# Patient Record
Sex: FEMALE | Race: WHITE | Employment: PART TIME | URBAN - METROPOLITAN AREA
[De-identification: names, ages, dates, MRNs, and addresses within clinical notes are randomized per-mention and may not be internally consistent; named-entity substitution may affect disease eponyms.]

---
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
## 2019-01-11 ENCOUNTER — OFFICE VISIT (OUTPATIENT)
Dept: GYNECOLOGY | Age: 19
End: 2019-01-11
Payer: COMMERCIAL

## 2019-01-11 VITALS
DIASTOLIC BLOOD PRESSURE: 80 MMHG | HEART RATE: 74 BPM | BODY MASS INDEX: 27.81 KG/M2 | SYSTOLIC BLOOD PRESSURE: 119 MMHG | WEIGHT: 157 LBS

## 2019-01-11 DIAGNOSIS — Z30.430 ENCOUNTER FOR IUD INSERTION: Primary | ICD-10-CM

## 2019-01-11 PROCEDURE — 99213 OFFICE O/P EST LOW 20 MIN: CPT | Performed by: OBSTETRICS & GYNECOLOGY

## 2019-01-11 PROCEDURE — 1036F TOBACCO NON-USER: CPT | Performed by: OBSTETRICS & GYNECOLOGY

## 2019-01-11 PROCEDURE — G8427 DOCREV CUR MEDS BY ELIG CLIN: HCPCS | Performed by: OBSTETRICS & GYNECOLOGY

## 2019-01-11 PROCEDURE — G8419 CALC BMI OUT NRM PARAM NOF/U: HCPCS | Performed by: OBSTETRICS & GYNECOLOGY

## 2019-01-11 PROCEDURE — G8484 FLU IMMUNIZE NO ADMIN: HCPCS | Performed by: OBSTETRICS & GYNECOLOGY

## 2019-02-01 ENCOUNTER — TELEPHONE (OUTPATIENT)
Dept: FAMILY MEDICINE CLINIC | Age: 19
End: 2019-02-01

## 2019-02-01 RX ORDER — FLUCONAZOLE 150 MG/1
150 TABLET ORAL ONCE
Qty: 1 TABLET | Refills: 0 | Status: SHIPPED | OUTPATIENT
Start: 2019-02-01 | End: 2019-02-01

## 2019-10-15 ENCOUNTER — OFFICE VISIT (OUTPATIENT)
Dept: FAMILY MEDICINE CLINIC | Age: 19
End: 2019-10-15
Payer: COMMERCIAL

## 2019-10-15 VITALS
BODY MASS INDEX: 26.05 KG/M2 | SYSTOLIC BLOOD PRESSURE: 108 MMHG | OXYGEN SATURATION: 97 % | WEIGHT: 147 LBS | HEIGHT: 63 IN | TEMPERATURE: 98.3 F | DIASTOLIC BLOOD PRESSURE: 78 MMHG | HEART RATE: 87 BPM

## 2019-10-15 DIAGNOSIS — F50.9 EATING DISORDER, UNSPECIFIED TYPE: ICD-10-CM

## 2019-10-15 DIAGNOSIS — K58.2 IRRITABLE BOWEL SYNDROME WITH BOTH CONSTIPATION AND DIARRHEA: ICD-10-CM

## 2019-10-15 DIAGNOSIS — F41.9 ANXIETY: ICD-10-CM

## 2019-10-15 DIAGNOSIS — F32.2 CURRENT SEVERE EPISODE OF MAJOR DEPRESSIVE DISORDER WITHOUT PSYCHOTIC FEATURES, UNSPECIFIED WHETHER RECURRENT (HCC): Primary | ICD-10-CM

## 2019-10-15 PROCEDURE — G8484 FLU IMMUNIZE NO ADMIN: HCPCS | Performed by: FAMILY MEDICINE

## 2019-10-15 PROCEDURE — 96160 PT-FOCUSED HLTH RISK ASSMT: CPT | Performed by: FAMILY MEDICINE

## 2019-10-15 PROCEDURE — 1036F TOBACCO NON-USER: CPT | Performed by: FAMILY MEDICINE

## 2019-10-15 PROCEDURE — G8427 DOCREV CUR MEDS BY ELIG CLIN: HCPCS | Performed by: FAMILY MEDICINE

## 2019-10-15 PROCEDURE — 99215 OFFICE O/P EST HI 40 MIN: CPT | Performed by: FAMILY MEDICINE

## 2019-10-15 PROCEDURE — G8419 CALC BMI OUT NRM PARAM NOF/U: HCPCS | Performed by: FAMILY MEDICINE

## 2019-10-15 RX ORDER — FLUOXETINE 10 MG/1
10 CAPSULE ORAL DAILY
Qty: 30 CAPSULE | Refills: 0 | Status: SHIPPED | OUTPATIENT
Start: 2019-10-15 | End: 2019-11-08 | Stop reason: CLARIF

## 2019-10-15 RX ORDER — FLUOXETINE HYDROCHLORIDE 20 MG/1
20 CAPSULE ORAL DAILY
Qty: 30 CAPSULE | Refills: 5 | Status: SHIPPED | OUTPATIENT
Start: 2019-10-15 | End: 2019-11-08 | Stop reason: CLARIF

## 2019-10-15 ASSESSMENT — COLUMBIA-SUICIDE SEVERITY RATING SCALE - C-SSRS
5. HAVE YOU STARTED TO WORK OUT OR WORKED OUT THE DETAILS OF HOW TO KILL YOURSELF? DO YOU INTEND TO CARRY OUT THIS PLAN?: NO
1. WITHIN THE PAST MONTH, HAVE YOU WISHED YOU WERE DEAD OR WISHED YOU COULD GO TO SLEEP AND NOT WAKE UP?: YES
4. HAVE YOU HAD THESE THOUGHTS AND HAD SOME INTENTION OF ACTING ON THEM?: YES
6. HAVE YOU EVER DONE ANYTHING, STARTED TO DO ANYTHING, OR PREPARED TO DO ANYTHING TO END YOUR LIFE?: NO
3. HAVE YOU BEEN THINKING ABOUT HOW YOU MIGHT KILL YOURSELF?: NO
2. HAVE YOU ACTUALLY HAD ANY THOUGHTS OF KILLING YOURSELF?: YES

## 2019-10-15 ASSESSMENT — PATIENT HEALTH QUESTIONNAIRE - PHQ9
9. THOUGHTS THAT YOU WOULD BE BETTER OFF DEAD, OR OF HURTING YOURSELF: 1
SUM OF ALL RESPONSES TO PHQ9 QUESTIONS 1 & 2: 4
6. FEELING BAD ABOUT YOURSELF - OR THAT YOU ARE A FAILURE OR HAVE LET YOURSELF OR YOUR FAMILY DOWN: 2
4. FEELING TIRED OR HAVING LITTLE ENERGY: 3
1. LITTLE INTEREST OR PLEASURE IN DOING THINGS: 2
SUM OF ALL RESPONSES TO PHQ QUESTIONS 1-9: 18
SUM OF ALL RESPONSES TO PHQ QUESTIONS 1-9: 18
5. POOR APPETITE OR OVEREATING: 3
10. IF YOU CHECKED OFF ANY PROBLEMS, HOW DIFFICULT HAVE THESE PROBLEMS MADE IT FOR YOU TO DO YOUR WORK, TAKE CARE OF THINGS AT HOME, OR GET ALONG WITH OTHER PEOPLE: 1
8. MOVING OR SPEAKING SO SLOWLY THAT OTHER PEOPLE COULD HAVE NOTICED. OR THE OPPOSITE, BEING SO FIGETY OR RESTLESS THAT YOU HAVE BEEN MOVING AROUND A LOT MORE THAN USUAL: 1
7. TROUBLE CONCENTRATING ON THINGS, SUCH AS READING THE NEWSPAPER OR WATCHING TELEVISION: 1
3. TROUBLE FALLING OR STAYING ASLEEP: 3
2. FEELING DOWN, DEPRESSED OR HOPELESS: 2

## 2019-10-17 PROBLEM — K58.2 IRRITABLE BOWEL SYNDROME WITH BOTH CONSTIPATION AND DIARRHEA: Status: ACTIVE | Noted: 2019-10-17

## 2019-10-17 PROBLEM — F50.9 EATING DISORDER: Status: ACTIVE | Noted: 2019-10-17

## 2019-10-17 PROBLEM — F32.2 CURRENT SEVERE EPISODE OF MAJOR DEPRESSIVE DISORDER WITHOUT PSYCHOTIC FEATURES (HCC): Status: ACTIVE | Noted: 2019-10-17

## 2019-10-29 ENCOUNTER — OFFICE VISIT (OUTPATIENT)
Dept: FAMILY MEDICINE CLINIC | Age: 19
End: 2019-10-29
Payer: COMMERCIAL

## 2019-10-29 VITALS
BODY MASS INDEX: 25.87 KG/M2 | OXYGEN SATURATION: 99 % | RESPIRATION RATE: 16 BRPM | DIASTOLIC BLOOD PRESSURE: 66 MMHG | TEMPERATURE: 97.5 F | HEART RATE: 91 BPM | SYSTOLIC BLOOD PRESSURE: 108 MMHG | HEIGHT: 63 IN | WEIGHT: 146 LBS

## 2019-10-29 DIAGNOSIS — H66.91 OTITIS MEDIA OF RIGHT EAR WITH RUPTURE OF TYMPANIC MEMBRANE: ICD-10-CM

## 2019-10-29 DIAGNOSIS — J02.9 SORE THROAT: Primary | ICD-10-CM

## 2019-10-29 DIAGNOSIS — R05.9 COUGH: ICD-10-CM

## 2019-10-29 DIAGNOSIS — H72.91 OTITIS MEDIA OF RIGHT EAR WITH RUPTURE OF TYMPANIC MEMBRANE: ICD-10-CM

## 2019-10-29 LAB
HETEROPHILE ANTIBODIES: NEGATIVE
INFLUENZA A ANTIGEN, POC: NEGATIVE
INFLUENZA B ANTIGEN, POC: NEGATIVE
S PYO AG THROAT QL: NORMAL

## 2019-10-29 PROCEDURE — G8419 CALC BMI OUT NRM PARAM NOF/U: HCPCS | Performed by: NURSE PRACTITIONER

## 2019-10-29 PROCEDURE — G8484 FLU IMMUNIZE NO ADMIN: HCPCS | Performed by: NURSE PRACTITIONER

## 2019-10-29 PROCEDURE — G8427 DOCREV CUR MEDS BY ELIG CLIN: HCPCS | Performed by: NURSE PRACTITIONER

## 2019-10-29 PROCEDURE — 87804 INFLUENZA ASSAY W/OPTIC: CPT | Performed by: NURSE PRACTITIONER

## 2019-10-29 PROCEDURE — 99214 OFFICE O/P EST MOD 30 MIN: CPT | Performed by: NURSE PRACTITIONER

## 2019-10-29 PROCEDURE — 87880 STREP A ASSAY W/OPTIC: CPT | Performed by: NURSE PRACTITIONER

## 2019-10-29 PROCEDURE — 1036F TOBACCO NON-USER: CPT | Performed by: NURSE PRACTITIONER

## 2019-10-29 PROCEDURE — 86308 HETEROPHILE ANTIBODY SCREEN: CPT | Performed by: NURSE PRACTITIONER

## 2019-10-29 RX ORDER — AMOXICILLIN AND CLAVULANATE POTASSIUM 875; 125 MG/1; MG/1
1 TABLET, FILM COATED ORAL 2 TIMES DAILY
Qty: 20 TABLET | Refills: 0 | Status: CANCELLED | OUTPATIENT
Start: 2019-10-29 | End: 2019-11-08

## 2019-10-29 RX ORDER — CIPROFLOXACIN AND DEXAMETHASONE 3; 1 MG/ML; MG/ML
4 SUSPENSION/ DROPS AURICULAR (OTIC) 2 TIMES DAILY
Qty: 1 BOTTLE | Refills: 0 | Status: SHIPPED | OUTPATIENT
Start: 2019-10-29 | End: 2019-11-05

## 2019-10-29 RX ORDER — AZITHROMYCIN 250 MG/1
TABLET, FILM COATED ORAL
Qty: 1 PACKET | Refills: 0 | Status: SHIPPED | OUTPATIENT
Start: 2019-10-29 | End: 2019-11-08

## 2019-10-29 ASSESSMENT — ENCOUNTER SYMPTOMS
RHINORRHEA: 1
SORE THROAT: 1
NAUSEA: 0
VOMITING: 0
ABDOMINAL PAIN: 0
DIARRHEA: 0
COUGH: 1

## 2019-10-31 LAB — THROAT CULTURE: NORMAL

## 2019-11-08 ENCOUNTER — OFFICE VISIT (OUTPATIENT)
Dept: FAMILY MEDICINE CLINIC | Age: 19
End: 2019-11-08
Payer: COMMERCIAL

## 2019-11-08 VITALS
BODY MASS INDEX: 26.05 KG/M2 | HEART RATE: 80 BPM | DIASTOLIC BLOOD PRESSURE: 82 MMHG | HEIGHT: 63 IN | OXYGEN SATURATION: 98 % | WEIGHT: 147 LBS | SYSTOLIC BLOOD PRESSURE: 110 MMHG

## 2019-11-08 DIAGNOSIS — H72.91 OTITIS MEDIA OF RIGHT EAR WITH RUPTURE OF TYMPANIC MEMBRANE: ICD-10-CM

## 2019-11-08 DIAGNOSIS — F32.2 CURRENT SEVERE EPISODE OF MAJOR DEPRESSIVE DISORDER WITHOUT PSYCHOTIC FEATURES, UNSPECIFIED WHETHER RECURRENT (HCC): Primary | ICD-10-CM

## 2019-11-08 DIAGNOSIS — H66.91 OTITIS MEDIA OF RIGHT EAR WITH RUPTURE OF TYMPANIC MEMBRANE: ICD-10-CM

## 2019-11-08 DIAGNOSIS — F50.9 EATING DISORDER, UNSPECIFIED TYPE: ICD-10-CM

## 2019-11-08 PROCEDURE — 1036F TOBACCO NON-USER: CPT | Performed by: FAMILY MEDICINE

## 2019-11-08 PROCEDURE — G8419 CALC BMI OUT NRM PARAM NOF/U: HCPCS | Performed by: FAMILY MEDICINE

## 2019-11-08 PROCEDURE — G8427 DOCREV CUR MEDS BY ELIG CLIN: HCPCS | Performed by: FAMILY MEDICINE

## 2019-11-08 PROCEDURE — 99214 OFFICE O/P EST MOD 30 MIN: CPT | Performed by: FAMILY MEDICINE

## 2019-11-08 PROCEDURE — G8484 FLU IMMUNIZE NO ADMIN: HCPCS | Performed by: FAMILY MEDICINE

## 2019-11-08 RX ORDER — FLUOXETINE 10 MG/1
10 CAPSULE ORAL DAILY
Qty: 30 CAPSULE | Refills: 3 | Status: SHIPPED | OUTPATIENT
Start: 2019-11-08

## 2020-10-12 ENCOUNTER — TELEPHONE (OUTPATIENT)
Dept: FAMILY MEDICINE CLINIC | Age: 20
End: 2020-10-12

## 2020-10-12 NOTE — TELEPHONE ENCOUNTER
----- Message from Randal Salgado sent at 10/12/2020  9:58 AM EDT -----  Subject: Message to Provider    QUESTIONS  Information for Provider? p/t would like her medical records sent to   [facility] in [place]   Fax#? [phone]. I notified her that she may need to sign a release   so she would like a call back to confirm. ---------------------------------------------------------------------------  --------------  Andrés BLOOD  What is the best way for the office to contact you? OK to leave message on   voicemail  Preferred Call Back Phone Number? [phone]  ---------------------------------------------------------------------------  --------------  SCRIPT ANSWERS  Relationship to Patient?  Self

## 2021-05-06 ENCOUNTER — OFFICE VISIT (OUTPATIENT)
Dept: ORTHOPEDIC SURGERY | Age: 21
End: 2021-05-06
Payer: COMMERCIAL

## 2021-05-06 VITALS — TEMPERATURE: 97.9 F | HEIGHT: 63 IN | RESPIRATION RATE: 14 BRPM | WEIGHT: 182.6 LBS | BODY MASS INDEX: 32.36 KG/M2

## 2021-05-06 DIAGNOSIS — M25.561 RIGHT KNEE PAIN, UNSPECIFIED CHRONICITY: Primary | ICD-10-CM

## 2021-05-06 PROCEDURE — G8417 CALC BMI ABV UP PARAM F/U: HCPCS | Performed by: ORTHOPAEDIC SURGERY

## 2021-05-06 PROCEDURE — 1036F TOBACCO NON-USER: CPT | Performed by: ORTHOPAEDIC SURGERY

## 2021-05-06 PROCEDURE — G8427 DOCREV CUR MEDS BY ELIG CLIN: HCPCS | Performed by: ORTHOPAEDIC SURGERY

## 2021-05-06 PROCEDURE — 99203 OFFICE O/P NEW LOW 30 MIN: CPT | Performed by: ORTHOPAEDIC SURGERY

## 2021-05-06 SDOH — ECONOMIC STABILITY: TRANSPORTATION INSECURITY
IN THE PAST 12 MONTHS, HAS THE LACK OF TRANSPORTATION KEPT YOU FROM MEDICAL APPOINTMENTS OR FROM GETTING MEDICATIONS?: NOT ASKED

## 2021-05-06 SDOH — ECONOMIC STABILITY: INCOME INSECURITY: HOW HARD IS IT FOR YOU TO PAY FOR THE VERY BASICS LIKE FOOD, HOUSING, MEDICAL CARE, AND HEATING?: NOT ASKED

## 2021-05-06 SDOH — HEALTH STABILITY: MENTAL HEALTH: HOW MANY STANDARD DRINKS CONTAINING ALCOHOL DO YOU HAVE ON A TYPICAL DAY?: NOT ASKED

## 2021-05-06 SDOH — ECONOMIC STABILITY: TRANSPORTATION INSECURITY
IN THE PAST 12 MONTHS, HAS LACK OF TRANSPORTATION KEPT YOU FROM MEETINGS, WORK, OR FROM GETTING THINGS NEEDED FOR DAILY LIVING?: NOT ASKED

## 2021-05-10 ENCOUNTER — TELEPHONE (OUTPATIENT)
Dept: ORTHOPEDIC SURGERY | Age: 21
End: 2021-05-10

## 2021-05-18 ENCOUNTER — HOSPITAL ENCOUNTER (OUTPATIENT)
Dept: MRI IMAGING | Age: 21
Discharge: HOME | End: 2021-05-18
Payer: COMMERCIAL

## 2021-05-18 DIAGNOSIS — M25.561 RIGHT KNEE PAIN, UNSPECIFIED CHRONICITY: ICD-10-CM

## 2021-05-18 PROCEDURE — 73721 MRI JNT OF LWR EXTRE W/O DYE: CPT

## 2021-05-20 ENCOUNTER — OFFICE VISIT (OUTPATIENT)
Dept: ORTHOPEDIC SURGERY | Age: 21
End: 2021-05-20
Payer: COMMERCIAL

## 2021-05-20 VITALS — WEIGHT: 182 LBS | RESPIRATION RATE: 14 BRPM | BODY MASS INDEX: 32.25 KG/M2 | HEIGHT: 63 IN

## 2021-05-20 DIAGNOSIS — M84.362A STRESS FRACTURE OF LEFT TIBIA, INITIAL ENCOUNTER: Primary | ICD-10-CM

## 2021-05-20 DIAGNOSIS — M22.2X1 PATELLOFEMORAL PAIN SYNDROME OF BOTH KNEES: ICD-10-CM

## 2021-05-20 DIAGNOSIS — M22.2X2 PATELLOFEMORAL PAIN SYNDROME OF BOTH KNEES: ICD-10-CM

## 2021-05-20 PROCEDURE — 1036F TOBACCO NON-USER: CPT | Performed by: ORTHOPAEDIC SURGERY

## 2021-05-20 PROCEDURE — G8427 DOCREV CUR MEDS BY ELIG CLIN: HCPCS | Performed by: ORTHOPAEDIC SURGERY

## 2021-05-20 PROCEDURE — G8417 CALC BMI ABV UP PARAM F/U: HCPCS | Performed by: ORTHOPAEDIC SURGERY

## 2021-05-20 PROCEDURE — 99213 OFFICE O/P EST LOW 20 MIN: CPT | Performed by: ORTHOPAEDIC SURGERY

## 2021-05-20 PROCEDURE — 27530 TREAT KNEE FRACTURE: CPT | Performed by: ORTHOPAEDIC SURGERY

## 2021-05-20 NOTE — PROGRESS NOTES
CHIEF COMPLAINT: Right knee pain. Date initial fracture care: 5/20/21    History:   Jordan Muñoz is a 23 y.o. female here for right knee follow-up. Initial history: self-referred for evaluation and treatment of right knee pain / injury. The patient complains of right knee pain. This is evaluated as a personal injury. The pain began 2 years ago. Rate pain 6/10. There was not a history of injury. The pain is located anterior/patellar. She does complain of medial pain also. Pain is worse with stairs, walking, squatting, kneeling, and sitting for long time. The knee has not given out or felt unstable. The patient can bend and straighten the knee fully. There is swelling in the knee. There was not catching / locking of the knee. The patient has not had PT. The patient has not had an injection. The patient has taken NSAIDs. The patient has tried ice. 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 Interval History: Her knee feels better now. She rates pain 3/10. She still has some pain and aching with prolonged standing. She has been taking Tylenol as needed. Past Medical History:   Diagnosis Date    Anorexia     Dysmenorrhea     Heavy periods     Irregular uterine bleeding        Past Surgical History:   Procedure Laterality Date    MYRINGOTOMY      TONSILLECTOMY AND ADENOIDECTOMY  2006       Family History   Problem Relation Age of Onset    Diabetes Mother        Social History     Socioeconomic History    Marital status: Single     Spouse name: Not on file    Number of children: Not on file    Years of education: Not on file    Highest education level: Not on file   Occupational History    Occupation: Visitation Facillitator     Employer: [employer]   Tobacco Use    Smoking status: Never Smoker    Smokeless tobacco: Never Used    Tobacco comment: encouraged not to start smoking.    Vaping Use    Vaping Use: Never used   Substance and Sexual Activity    Physical Examination:     Vital signs:   Ht 5' 3\" (1.6 m)   Wt 182 lb (82.6 kg)   BMI 32.24 kg/m²     General:  alert, appears stated age, cooperative and no distress   Gait:  Normal. The patient can bear weight on the injured extremity. Right Knee  Alignment:  neutral   ROM:  0 degrees extension to 120 degrees flexion   Bilateral knees   Crepitus:  mild patellar   Joint Tenderness:  medial joint line bilaterally. She does have some tenderness along her proximal medial tibial metaphysis   Effusion:   20 cc   Patellar excursion:  1 of 4 quadrants. She is unable to relax either knee. Patellar tilt test:  positive, though she cannot relax either knee. Patellar facet tenderness:  positive medial   positive lateral   Lachman test:  negative   Left knee: negative   Anterior drawer test:  negative   Left knee: negative   Posterior drawer:   negative    Left knee: negative   Varus laxity at 30 degrees:  negative   Left knee: negative   Valgus laxity at 30 degrees:   negative   Left knee: negative   Anthony's test:  positive   Left knee: negative     There is not any cellulitis, lymphedema or cutaneous lesions noted in the lower extremities. Motor exam of the lower extremities show quadriceps, hamstrings, foot dorsiflexion and plantarflexion grossly intact. Sensation to both feet is grossly intact to light touch. The bilateral lower extremities are warm and well-perfused with brisk capillary refill. Imaging:  Right Knee X-Ray 5/6/21: No fracture, dislocation, lytic lesion. Normal joint spaces     Right Knee MRI 5/18/21: I independently reviewed the images, as well as the radiology report.    Nondisplaced fracture involving the medial aspect of the proximal tibial   metaphysis.  This is favored to be a stress fracture based on location.       Cruciate and collateral ligaments are intact.  No meniscal tear identified.           Vitamin D25 Hydroxy: 12.4      Assessment:     Right tibial metaphyseal stress fracture  Right knee patellofemoral syndrome  Left knee patellofemoral syndrome  Vitamin D deficiency      Plan:     Natural history and expected course discussed. Questions answered. Discussed with patient that I do suspect that the majority of her symptoms are still coming from patellofemoral syndrome. PT referral provided for patellofemoral syndrome. We reviewed the MRI images and discussed the findings. We discussed that there appears to be medial tibial metaphyseal stress fracture. Discussed that fracture can be treated nonoperatively. We discussed that her previous blood work did show vitamin D deficiency. Discussed that the stress fracture could be the result of altered bone metabolism secondary to her vitamin D deficiency. She is to follow-up with her PCP for further work-up and treatment. Follow-up with me for her patellofemoral syndrome in 2 to 3 months. Royce Moulton. Ellen Robb MD  Orthopaedic Surgery and Sports Medicine     Disclaimer: This note was generated with use of a verbal recognition program (DRAGON) and an attempt was made to check for errors. It is possible that there are still dictated errors within this office note. If so, please bring any significant errors to my attention for an addendum. All efforts were made to ensure that this office note is accurate.

## 2021-05-27 ENCOUNTER — HOSPITAL ENCOUNTER (OUTPATIENT)
Dept: PHYSICAL THERAPY | Age: 21
Setting detail: THERAPIES SERIES
Discharge: HOME | End: 2021-05-27
Payer: COMMERCIAL

## 2021-05-27 PROCEDURE — 97161 PT EVAL LOW COMPLEX 20 MIN: CPT

## 2021-05-27 PROCEDURE — 97110 THERAPEUTIC EXERCISES: CPT

## 2021-05-27 NOTE — PLAN OF CARE
Nils Yeung [address]                                                       Physical Therapy Certification    Dear Referring Practitioner: Alfonso Rolle,    We had the pleasure of evaluating the following patient for physical therapy services at [address] [place] [address]. A summary of our findings can be found in the initial assessment below. This includes our plan of care. If you have any questions or concerns regarding these findings, please do not hesitate to contact me at the office phone number checked above. Thank you for the referral.       Physician Signature:_______________________________Date:__________________  By signing above (or electronic signature), therapists plan is approved by physician      Patient: Sammi Zee   : 2000   MRN: [id number]  Referring Physician: Referring Practitioner: Alfonso Rolle      Evaluation Date: 2021      Medical Diagnosis Information:  Diagnosis: N05.799 Stress fracture of L tibia; M25.562 L knee pain; M25.561 R knee pain   Treatment Diagnosis: M84.362 Stress fracture of L tibia; M25.562 L knee pain; M25.561 R knee pain                                         Insurance information: PT Insurance Information: [facility] - 30 PT visits     Precautions/ Contra-indications: Anxiety/depression. C-SSRS Triggered by Intake questionnaire (Past 2 wk assessment):   [x] No, Questionnaire did not trigger screening.   [] Yes, Patient intake triggered further evaluation      [] C-SSRS Screening completed  [] PCP notified via Plan of Care  [] Emergency services notified     Latex Allergy:  [x]NO      []YES  Preferred Language for Healthcare:   [x]English       []other:     SUBJECTIVE: Patient stated complaint:  Patient's bilateral knee pain initially started about two years ago when patient was running a lot on the treadmill.  The pain she had then HIP Flexors 4/5 4/5 p! HIP Abductors 4-/5  3+/5 p! HIP Ext 3+/5 p! 3+/5 p! Hip ER     Knee EXT (quad) 4/5 4-/5 p! Knee Flex (HS) 4/5 4-/5 p! Ankle DF     Ankle PF     Ankle Inv     Ankle EV          Circumference  Mid apex  7 cm prox     40.0 cm  44.5 cm   40.0 cm  44.5 cm       Balance:  SLS:  Mild trendelenberg bilaterally when in SLS; significant medial/lateral rotation in R knee when in R SLS; increased R>L knee pain in SLS. Reflexes/Sensation:    [x]Dermatomes/Myotomes intact    [x]Reflexes equal and normal bilaterally    []Other:    Joint mobility: Patella mobility   [x]Normal    []Hypo   []Hyper     Palpation:     Functional Mobility/Transfers:   Squats:  Initiates with hip hinge strategy; patient offloads onto L LE; does not reach full depth, but denies increased baseline pain sxs. Posture: Wider Q-angle; mild genu valgum bilaterally. Bandages/Dressings/Incisions: NA    Gait: Increased transverse plane motion at the hip during swing phase of gait bilaterally. Orthopedic Special Tests: Hamstring flexibility severely restricted bilaterally L>R. Rectus femoris length moderately restricted bilaterally R>L. R rectus femoris length testing reproduces mild R knee pain. [x] Patient history, allergies, meds reviewed. Medical chart reviewed. See intake form. Review Of Systems (ROS):  [x]Performed Review of systems (Integumentary, CardioPulmonary, Neurological) by intake and observation. Intake form has been scanned into medical record. Patient has been instructed to contact their primary care physician regarding ROS issues if not already being addressed at this time.       Co-morbidities/Complexities (which will affect course of rehabilitation):   []None           Arthritic conditions   []Rheumatoid arthritis (M05.9)  []Osteoarthritis (M19.91)   Cardiovascular conditions   []Hypertension (I10)  []Hyperlipidemia (E78.5)  []Angina pectoris (I20)  []Atherosclerosis (I70) Musculoskeletal conditions   []Disc pathology   []Congenital spine pathologies   []Prior surgical intervention  []Osteoporosis (M81.8)  []Osteopenia (M85.8)   Endocrine conditions   []Hypothyroid (E03.9)  []Hyperthyroid Gastrointestinal conditions   []Constipation (D82.74)   Metabolic conditions   []Morbid obesity (E66.01)  []Diabetes type 1(E10.65) or 2 (E11.65)   []Neuropathy (G60.9)     Pulmonary conditions   []Asthma (J45)  []Coughing   []COPD (J44.9)   Psychological Disorders  [x]Anxiety (F41.9)  [x]Depression (F32.9)   []Other:   []Other:          Barriers to/and or personal factors that will affect rehab potential:              []Age  []Sex              []Motivation/Lack of Motivation                        []Co-Morbidities              []Cognitive Function, education/learning barriers              []Environmental, home barriers              []profession/work barriers  []past PT/medical experience  []other:  Justification:     Falls Risk Assessment (30 days):   [x] Falls Risk assessed and no intervention required. [] Falls Risk assessed and Patient requires intervention due to being higher risk   TUG score (>12s at risk):     [] Falls education provided, including         ASSESSMENT: Patient is a 23 y/o F who reports to PT with bilateral knee pain secondary to likely bilateral tibial stress fractures (only L side had an MRI to confirm) related to recent increase in physical activity for health and fitness reasons, however, patient has a history of knee pain similar to this type of pain prior to this particular episode indicating likely PFPS. Patient demonstrates s/s consistent with tibial stress fractures and likely PFPS including hamstring and rectus femoris muscle length/flexibility deficits, significant quad and hamstring strength deficits, and global hip strength deficits.  Patient will benefit from skilled PT to address restrictions/limitations to enable full, safe return to physical activity without instruction: (see scanned forms)    GOALS:  Patient stated goal: Increase strength. Be able to run for health and fitness. [x] Progressing: [] Met: [] Not Met: [] Adjusted    Therapist goals for Patient:   Short Term Goals: To be achieved in: 2 weeks  1. Independent in HEP and progression per patient tolerance, in order to prevent re-injury. [x] Progressing: [] Met: [] Not Met: [] Adjusted  2. Patient will have a decrease in pain to facilitate improvement in movement, function, and ADLs as indicated by Functional Deficits. [x] Progressing: [] Met: [] Not Met: [] Adjusted    Long Term Goals: To be achieved in: 6 weeks  1. Disability index score of 10% or less for the LEFS to assist with reaching prior level of function. [x] Progressing: [] Met: [] Not Met: [] Adjusted  2. Patient will demonstrate an increase in strength to good proximal hip strength and control, within 5lb HHD in LE to allow for proper functional mobility as indicated by patients Functional Deficits. [x] Progressing: [] Met: [] Not Met: [] Adjusted  3. Patient will be able to perform all aspects of daily mobility including all ADLs and work tasks without increased symptoms or restriction. [x] Progressing: [] Met: [] Not Met: [] Adjusted  4. Patient will be able to go up/down stairs with reciprocal  Mechanics without increased symptoms or restriction. [x] Progressing: [] Met: [] Not Met: [] Adjusted  5. Patient will be able to initiate a running program for health and fitness without increased symptoms or restriction.   [x] Progressing: [] Met: [] Not Met: [] Adjusted     Electronically signed by:  Mary Rg, PT, DPT

## 2021-05-27 NOTE — FLOWSHEET NOTE
[facility], [place]    Physical Therapy Treatment Note/ Progress Report:     Date:  2021    Patient Name:  Ese Gonzalez    :  2000  MRN: [id number]  Medical/Treatment Diagnosis Information:  · Diagnosis: J06.128 Stress fracture of L tibia; M25.562 L knee pain; M25.561 R knee pain  · Treatment Diagnosis: M84.362 Stress fracture of L tibia; M25.562 L knee pain; M25.561 R knee pain  Insurance/Certification information:  PT Insurance Information: [employer] - 30 PT visits  Physician Information:  Referring Practitioner: Laureen Beaver  Plan of care signed (Y/N):     Date of Patient follow up with Physician:      Progress Report: [x]  Yes  []  No     Functional Scale: 28% disability - LEFS (58/80)   Date: 21    Date Range for reporting period:  Beginnin21  Endin days or 10 visits    Progress report due (10 Rx/or 30 days whichever is less):      Recertification due (POC duration/ or 90 days whichever is less): 21     Visit # Insurance Allowable Auth Needed   1 [employer] - 30 PT visits [x]Yes    []No     Pain level:  R knee:  2/10 at start of session. 7/10 at worst. 2/10 at best.  L knee:  0/10 at start of session. 0/10 at best. 4-5/10 at worst.      SUBJECTIVE:  See eval    OBJECTIVE: See eval   Observation:    Test measurements:      RESTRICTIONS/PRECAUTIONS: Anxiety/depression. Exercises/Interventions:     Therapeutic Exercise  Min:  30 Resistance Sets/sec Reps Notes   Hamstring stretch - long sit or supine w/strap  30s 3-5 Bilateral   Prone rectus femoris stretch w/1/2 foam bolster under distal thigh  30s 3-5 Bilateral   Quad sets - long sit, supine, or seated on chair (for work positioning)  10s 10 Bilateral   SLR flexion  2 10 Bilateral; Cues to initiate with quad set first          Patient education.    8 min Findings, purpose, focus, goals of PT; HEP                                               Therapeutic Activities  Min:  0                                                               Manual Intervention  Min:  0       Knee mobs/PROM       Tib/Fem Mobs       Patella Mobs       Ankle mobs                     NMR Re-education  Min:  0                                                                          Therapeutic Exercise and NMR EXR  [x] (42781) Provided verbal/tactile cueing for activities related to strengthening, flexibility, endurance, ROM for improvements in LE, proximal hip, and core control with self care, mobility, lifting, ambulation. [x] (21728) Provided verbal/tactile cueing for activities related to improving balance, coordination, kinesthetic sense, posture, motor skill, proprioception  to assist with LE, proximal hip, and core control in self care, mobility, lifting, ambulation and eccentric single leg control.      NMR and Therapeutic Activities:    [x] (48073 or 46806) Provided verbal/tactile cueing for activities related to improving balance, coordination, kinesthetic sense, posture, motor skill, proprioception and motor activation to allow for proper function of core, proximal hip and LE with self care and ADLs  [] (19873) Gait Re-education- Provided training and instruction to the patient for proper LE, core and proximal hip recruitment and positioning and eccentric body weight control with ambulation re-education including up and down stairs     Home Exercise Program:    [x] (52417) Reviewed/Progressed HEP activities related to strengthening, flexibility, endurance, ROM of core, proximal hip and LE for functional self-care, mobility, lifting and ambulation/stair navigation   [x] (76805)Reviewed/Progressed HEP activities related to improving balance, coordination, kinesthetic sense, posture, motor skill, proprioception of core, proximal hip and LE for self care, mobility, lifting, and ambulation/stair navigation      Manual Treatments:  PROM / STM / Oscillations-Mobs:  G-I, II, III, IV (PA's, Inf., Post.)  [x] (51591) Provided manual therapy to mobilize LE, proximal hip and/or LS spine soft tissue/joints for the purpose of modulating pain, promoting relaxation,  increasing ROM, reducing/eliminating soft tissue swelling/inflammation/restriction, improving soft tissue extensibility and allowing for proper ROM for normal function with self care, mobility, lifting and ambulation. Modalities:      Charges:  Timed Code Treatment Minutes: 30   Total Treatment Minutes: 50       [x] EVAL (LOW) 50630 (typically 20 minutes face-to-face)  [] EVAL (MOD) 72070 (typically 30 minutes face-to-face)  [] EVAL (HIGH) 70758 (typically 45 minutes face-to-face)  [] RE-EVAL     [x] ZE(78751) x 2    [] IONTO (67064)  [] NMR (89008) x     [] VASO (08565)  [] Manual (64313) x     [] Other:  [] TA (53413)x     [] Mech Traction (46281)  [] ES(attended) (70021)     [] ES (un) (56196):     GOALS:  Patient stated goal: Increase strength. Be able to run for health and fitness. [x] Progressing: [] Met: [] Not Met: [] Adjusted    Therapist goals for Patient:   Short Term Goals: To be achieved in: 2 weeks  1. Independent in HEP and progression per patient tolerance, in order to prevent re-injury. [x] Progressing: [] Met: [] Not Met: [] Adjusted  2. Patient will have a decrease in pain to facilitate improvement in movement, function, and ADLs as indicated by Functional Deficits. [x] Progressing: [] Met: [] Not Met: [] Adjusted    Long Term Goals: To be achieved in: 6 weeks  1. Disability index score of 10% or less for the LEFS to assist with reaching prior level of function. [x] Progressing: [] Met: [] Not Met: [] Adjusted  2. Patient will demonstrate an increase in strength to good proximal hip strength and control, within 5lb HHD in LE to allow for proper functional mobility as indicated by patients Functional Deficits. [x] Progressing: [] Met: [] Not Met: [] Adjusted  3.  Patient will be able to perform all aspects of daily mobility including all ADLs and work tasks without increased symptoms or restriction. [x] Progressing: [] Met: [] Not Met: [] Adjusted  4. Patient will be able to go up/down stairs with reciprocal  Mechanics without increased symptoms or restriction. [x] Progressing: [] Met: [] Not Met: [] Adjusted  5. Patient will be able to initiate a running program for health and fitness without increased symptoms or restriction. [x] Progressing: [] Met: [] Not Met: [] Adjusted     Progression Towards Functional goals:  [] Patient is progressing as expected towards functional goals listed. [] Progression is slowed due to complexities listed. [] Progression has been slowed due to co-morbidities. [x] Plan just implemented, too soon to assess goals progression  [] Other:     ASSESSMENT:  See marlo    Return to Play: (if applicable)   []  Stage 1: Intro to Strength   []  Stage 2: Return to Run and Strength   []  Stage 3: Return to Jump and Strength   []  Stage 4: Dynamic Strength and Agility   []  Stage 5: Sport Specific Training     []  Ready to Return to Play, Meets All Above Stages   []  Not Ready for Return to Sports   Comments:            Treatment/Activity Tolerance:  [x] Patient tolerated treatment well [] Patient limited by fatique  [] Patient limited by pain  [] Patient limited by other medical complications  [] Other:     Overall Progression Towards Functional goals/ Treatment Progress Update:  [] Patient is progressing as expected towards functional goals listed. [] Progression is slowed due to complexities/Impairments listed. [] Progression has been slowed due to co-morbidities.   [x] Plan just implemented, too soon to assess goals progression <30days   [] Goals require adjustment due to lack of progress  [] Patient is not progressing as expected and requires additional follow up with physician  [] Other    Prognosis for POC: [x] Good [] Fair  [] Poor    Patient requires continued skilled intervention: [x] Yes  [] No        PLAN: See eval  [] Continue per plan of care [] Alter current plan (see comments)  [x] Plan of care initiated [] Hold pending MD visit [] Discharge    Electronically signed by: Kerrie Stephens PT    Note: If patient does not return for scheduled/recommended follow up visits, this note will serve as a discharge from care along with the most recent update on progress.

## 2021-06-10 ENCOUNTER — HOSPITAL ENCOUNTER (OUTPATIENT)
Dept: PHYSICAL THERAPY | Age: 21
Setting detail: THERAPIES SERIES
Discharge: HOME | End: 2021-06-10
Payer: COMMERCIAL

## 2021-06-10 PROCEDURE — 97112 NEUROMUSCULAR REEDUCATION: CPT

## 2021-06-10 PROCEDURE — 97110 THERAPEUTIC EXERCISES: CPT

## 2021-06-10 NOTE — FLOWSHEET NOTE
ankle weight on R; 4# on L 5s 20 Bilateral   Glut bridge Green loop around knees 5s 10    Clamshells Green loop around knees 1 15 Bilateral          Patient education. 5 min Updated HEP                          Therapeutic Activities  Min:  0                                                               Manual Intervention  Min:  0       Knee mobs/PROM       Tib/Fem Mobs       Patella Mobs       Ankle mobs                     NMR Re-education  Min:  13       Cable column TKE lvl 4 5s 20 Bilateral    SLS balance  20s 3 Bilateral                                                        Therapeutic Exercise and NMR EXR  [x] (39943) Provided verbal/tactile cueing for activities related to strengthening, flexibility, endurance, ROM for improvements in LE, proximal hip, and core control with self care, mobility, lifting, ambulation. [x] (75887) Provided verbal/tactile cueing for activities related to improving balance, coordination, kinesthetic sense, posture, motor skill, proprioception  to assist with LE, proximal hip, and core control in self care, mobility, lifting, ambulation and eccentric single leg control.      NMR and Therapeutic Activities:    [x] (47833 or 49202) Provided verbal/tactile cueing for activities related to improving balance, coordination, kinesthetic sense, posture, motor skill, proprioception and motor activation to allow for proper function of core, proximal hip and LE with self care and ADLs  [] (66676) Gait Re-education- Provided training and instruction to the patient for proper LE, core and proximal hip recruitment and positioning and eccentric body weight control with ambulation re-education including up and down stairs     Home Exercise Program:    [x] (15537) Reviewed/Progressed HEP activities related to strengthening, flexibility, endurance, ROM of core, proximal hip and LE for functional self-care, mobility, lifting and ambulation/stair navigation   [x] (46206)Reviewed/Progressed HEP activities related to improving balance, coordination, kinesthetic sense, posture, motor skill, proprioception of core, proximal hip and LE for self care, mobility, lifting, and ambulation/stair navigation      Manual Treatments:  PROM / STM / Oscillations-Mobs:  G-I, II, III, IV (PA's, Inf., Post.)  [x] (48852) Provided manual therapy to mobilize LE, proximal hip and/or LS spine soft tissue/joints for the purpose of modulating pain, promoting relaxation,  increasing ROM, reducing/eliminating soft tissue swelling/inflammation/restriction, improving soft tissue extensibility and allowing for proper ROM for normal function with self care, mobility, lifting and ambulation. Modalities:      Charges:  Timed Code Treatment Minutes: 50   Total Treatment Minutes: 50       [] EVAL (LOW) 92008 (typically 20 minutes face-to-face)  [] EVAL (MOD) 83890 (typically 30 minutes face-to-face)  [] EVAL (HIGH) 29180 (typically 45 minutes face-to-face)  [] RE-EVAL     [x] AF(36154) x 2    [] IONTO (55844)  [x] NMR (94767) x 1    [] VASO (17346)  [] Manual (79182) x     [] Other:  [] TA (40621)x     [] Mech Traction (20244)  [] ES(attended) (53873)     [] ES (un) (97494):     GOALS:  Patient stated goal: Increase strength. Be able to run for health and fitness. [x] Progressing: [] Met: [] Not Met: [] Adjusted    Therapist goals for Patient:   Short Term Goals: To be achieved in: 2 weeks  1. Independent in HEP and progression per patient tolerance, in order to prevent re-injury. [x] Progressing: [] Met: [] Not Met: [] Adjusted  2. Patient will have a decrease in pain to facilitate improvement in movement, function, and ADLs as indicated by Functional Deficits. [x] Progressing: [] Met: [] Not Met: [] Adjusted    Long Term Goals: To be achieved in: 6 weeks  1. Disability index score of 10% or less for the LEFS to assist with reaching prior level of function. [x] Progressing: [] Met: [] Not Met: [] Adjusted  2.  Patient will demonstrate an increase in strength to good proximal hip strength and control, within 5lb HHD in LE to allow for proper functional mobility as indicated by patients Functional Deficits. [x] Progressing: [] Met: [] Not Met: [] Adjusted  3. Patient will be able to perform all aspects of daily mobility including all ADLs and work tasks without increased symptoms or restriction. [x] Progressing: [] Met: [] Not Met: [] Adjusted  4. Patient will be able to go up/down stairs with reciprocal  Mechanics without increased symptoms or restriction. [x] Progressing: [] Met: [] Not Met: [] Adjusted  5. Patient will be able to initiate a running program for health and fitness without increased symptoms or restriction. [x] Progressing: [] Met: [] Not Met: [] Adjusted     Progression Towards Functional goals:  [] Patient is progressing as expected towards functional goals listed. [] Progression is slowed due to complexities listed. [] Progression has been slowed due to co-morbidities. [x] Plan just implemented, too soon to assess goals progression  [] Other:     ASSESSMENT:  +HEP compliance. Does note some mild achiness in R medial knee following performance of SLR as part of HEP. Reviewed stretches and quad activation/strength exercises initiated previous session to ensure proper technique. Patient was not keeping R knee fully straight throughout performance of SLR which likely caused R medial knee achiness following HEP performance outside of PT. Patient denies R meidal knee achiness with performance of SLR with cueing for improved technique within PT session today. Progressed quad strengthening tasks to include SLR+ (Guthrie quad set) and weight bearing quad activation/strength tasks with cable column TKE. Also initiated hip strengthening tasks today. Patient fatigues very quickly with hip strengthening tasks. Updated HEP for further carryover between sessions.      Return to Play: (if applicable)   []  Stage 1: Intro to Strength   []  Stage 2: Return to Run and Strength   []  Stage 3: Return to Jump and Strength   []  Stage 4: Dynamic Strength and Agility   []  Stage 5: Sport Specific Training     []  Ready to Return to Play, Meets All Above Stages   []  Not Ready for Return to Sports   Comments:            Treatment/Activity Tolerance:  [x] Patient tolerated treatment well [] Patient limited by fatique  [] Patient limited by pain  [] Patient limited by other medical complications  [] Other:     Overall Progression Towards Functional goals/ Treatment Progress Update:  [] Patient is progressing as expected towards functional goals listed. [] Progression is slowed due to complexities/Impairments listed. [] Progression has been slowed due to co-morbidities. [x] Plan just implemented, too soon to assess goals progression <30days   [] Goals require adjustment due to lack of progress  [] Patient is not progressing as expected and requires additional follow up with physician  [] Other    Prognosis for POC: [x] Good [] Fair  [] Poor    Patient requires continued skilled intervention: [x] Yes  [] No        PLAN: See eval  [] Continue per plan of care [] Alter current plan (see comments)  [x] Plan of care initiated [] Hold pending MD visit [] Discharge    Electronically signed by: Stephanie Paulson, PT, DPT    Note: If patient does not return for scheduled/recommended follow up visits, this note will serve as a discharge from care along with the most recent update on progress.

## 2021-06-17 ENCOUNTER — HOSPITAL ENCOUNTER (OUTPATIENT)
Dept: PHYSICAL THERAPY | Age: 21
Setting detail: THERAPIES SERIES
Discharge: HOME | End: 2021-06-17
Payer: COMMERCIAL

## 2021-06-17 ENCOUNTER — APPOINTMENT (OUTPATIENT)
Dept: PHYSICAL THERAPY | Age: 21
End: 2021-06-17
Payer: COMMERCIAL

## 2021-06-18 ENCOUNTER — HOSPITAL ENCOUNTER (OUTPATIENT)
Dept: PHYSICAL THERAPY | Age: 21
Setting detail: THERAPIES SERIES
Discharge: HOME | End: 2021-06-18
Payer: COMMERCIAL

## 2021-06-18 PROCEDURE — 97110 THERAPEUTIC EXERCISES: CPT

## 2021-06-18 PROCEDURE — 97112 NEUROMUSCULAR REEDUCATION: CPT

## 2021-06-18 NOTE — FLOWSHEET NOTE
[facility], [place]    Physical Therapy Treatment Note/ Progress Report:     Date:  2021    Patient Name:  Amalia Bess    :  2000  MRN: [id number]  Medical/Treatment Diagnosis Information:  · Diagnosis: B44.370 Stress fracture of L tibia; M25.562 L knee pain; M25.561 R knee pain  · Treatment Diagnosis: M84.362 Stress fracture of L tibia; M25.562 L knee pain; M25.561 R knee pain  Insurance/Certification information:  PT Insurance Information: [facility] - 30 PT visits  Physician Information:  Referring Practitioner: Josue Bender  Plan of care signed (Y/N):     Date of Patient follow up with Physician:      Progress Report: []  Yes  [x]  No     Functional Scale: 28% disability - LEFS ()   Date: 21    Date Range for reporting period:  Beginnin21  Endin days or 10 visits    Progress report due (10 Rx/or 30 days whichever is less):      Recertification due (POC duration/ or 90 days whichever is less): 21     Visit # Insurance Allowable Auth Needed   3 16 visits approved 21-21 [x]Yes    []No     Pain level:  R knee:  4-5/10 at start of session; L knee:  4-5/10 at start of session    SUBJECTIVE:  Pt feels like her knees are more sore today, feels like left is more painful than right today medially. OBJECTIVE: See eval   Observation:    Test measurements:      RESTRICTIONS/PRECAUTIONS: Anxiety/depression.     Exercises/Interventions:     Therapeutic Exercise  Min:  34 Resistance Sets/sec Reps Notes   Recumbent bike   5min Gentle rom    Hamstring stretch - long sit   30s 3 Bilateral   Prone rectus femoris stretch w/1/2 foam bolster under distal thigh  30s 3 Bilateral   ITB stretch w/ rope   30s 3x    Quad sets - long sit, supine, or seated on chair (for work positioning)  10s 10 Bilateral   SLR flexion  2 10 Bilateral; Cues to initiate with quad set first; dc'd weight d/t increase pain    SLR abduction   2 10 bilateral   Bilateral   Bilateral   Glut bridge Green loop around knees 5s 10    Clamshells Green loop around knees 1 15 Bilateral          Patient education. 5 min Updated HEP                          Therapeutic Activities  Min:  0                                                               Manual Intervention  Min:  0       Knee mobs/PROM       Tib/Fem Mobs       Patella Mobs       Ankle mobs                     NMR Re-education  Min:  13       Cable column TKE lvl 4 5s 20 Bilateral    SLS balance  20s 3 Bilateral                                                        Therapeutic Exercise and NMR EXR  [x] (83283) Provided verbal/tactile cueing for activities related to strengthening, flexibility, endurance, ROM for improvements in LE, proximal hip, and core control with self care, mobility, lifting, ambulation. [x] (12123) Provided verbal/tactile cueing for activities related to improving balance, coordination, kinesthetic sense, posture, motor skill, proprioception  to assist with LE, proximal hip, and core control in self care, mobility, lifting, ambulation and eccentric single leg control.      NMR and Therapeutic Activities:    [x] (43171 or 26286) Provided verbal/tactile cueing for activities related to improving balance, coordination, kinesthetic sense, posture, motor skill, proprioception and motor activation to allow for proper function of core, proximal hip and LE with self care and ADLs  [] (74377) Gait Re-education- Provided training and instruction to the patient for proper LE, core and proximal hip recruitment and positioning and eccentric body weight control with ambulation re-education including up and down stairs     Home Exercise Program:    [x] (77953) Reviewed/Progressed HEP activities related to strengthening, flexibility, endurance, ROM of core, proximal hip and LE for functional self-care, mobility, lifting and ambulation/stair navigation   [x] (52932)Reviewed/Progressed HEP activities related to improving balance, coordination, kinesthetic sense, posture, motor skill, proprioception of core, proximal hip and LE for self care, mobility, lifting, and ambulation/stair navigation      Manual Treatments:  PROM / STM / Oscillations-Mobs:  G-I, II, III, IV (PA's, Inf., Post.)  [x] (28518) Provided manual therapy to mobilize LE, proximal hip and/or LS spine soft tissue/joints for the purpose of modulating pain, promoting relaxation,  increasing ROM, reducing/eliminating soft tissue swelling/inflammation/restriction, improving soft tissue extensibility and allowing for proper ROM for normal function with self care, mobility, lifting and ambulation. Modalities:      Charges:  Timed Code Treatment Minutes: 45   Total Treatment Minutes: 45       [] EVAL (LOW) 39525 (typically 20 minutes face-to-face)  [] EVAL (MOD) 03923 (typically 30 minutes face-to-face)  [] EVAL (HIGH) 20756 (typically 45 minutes face-to-face)  [] RE-EVAL     [x] NT(38988) x 2    [] IONTO (73047)  [x] NMR (22218) x 1    [] VASO (62636)  [] Manual (86816) x     [] Other:  [] TA (12660)x     [] Mech Traction (09160)  [] ES(attended) (63799)     [] ES (un) (87643):     GOALS:  Patient stated goal: Increase strength. Be able to run for health and fitness. [x] Progressing: [] Met: [] Not Met: [] Adjusted    Therapist goals for Patient:   Short Term Goals: To be achieved in: 2 weeks  1. Independent in HEP and progression per patient tolerance, in order to prevent re-injury. [x] Progressing: [] Met: [] Not Met: [] Adjusted  2. Patient will have a decrease in pain to facilitate improvement in movement, function, and ADLs as indicated by Functional Deficits. [x] Progressing: [] Met: [] Not Met: [] Adjusted    Long Term Goals: To be achieved in: 6 weeks  1. Disability index score of 10% or less for the LEFS to assist with reaching prior level of function. [x] Progressing: [] Met: [] Not Met: [] Adjusted  2.  Patient will demonstrate an increase in strength to good proximal hip strength and control, within 5lb HHD in LE to allow for proper functional mobility as indicated by patients Functional Deficits. [x] Progressing: [] Met: [] Not Met: [] Adjusted  3. Patient will be able to perform all aspects of daily mobility including all ADLs and work tasks without increased symptoms or restriction. [x] Progressing: [] Met: [] Not Met: [] Adjusted  4. Patient will be able to go up/down stairs with reciprocal  Mechanics without increased symptoms or restriction. [x] Progressing: [] Met: [] Not Met: [] Adjusted  5. Patient will be able to initiate a running program for health and fitness without increased symptoms or restriction. [x] Progressing: [] Met: [] Not Met: [] Adjusted     Progression Towards Functional goals:  [] Patient is progressing as expected towards functional goals listed. [] Progression is slowed due to complexities listed. [] Progression has been slowed due to co-morbidities. [x] Plan just implemented, too soon to assess goals progression  [] Other:     ASSESSMENT:      Return to Play: (if applicable)   []  Stage 1: Intro to Strength   []  Stage 2: Return to Run and Strength   []  Stage 3: Return to Jump and Strength   []  Stage 4: Dynamic Strength and Agility   []  Stage 5: Sport Specific Training     []  Ready to Return to Play, Meets All Above Stages   []  Not Ready for Return to Sports   Comments:            Treatment/Activity Tolerance:  [x] Patient tolerated treatment well [] Patient limited by fatique  [] Patient limited by pain  [] Patient limited by other medical complications  [] Other: pt able to complete exercises without increase in pain today, she did note by end of session the knee felt looser. She was able to add in hip abd and iso leg press to mimic wall sit without issue. Continue to progress as tolerated.      Overall Progression Towards Functional goals/ Treatment Progress Update:  [] Patient is progressing as expected towards functional goals listed. [] Progression is slowed due to complexities/Impairments listed. [] Progression has been slowed due to co-morbidities. [x] Plan just implemented, too soon to assess goals progression <30days   [] Goals require adjustment due to lack of progress  [] Patient is not progressing as expected and requires additional follow up with physician  [] Other    Prognosis for POC: [x] Good [] Fair  [] Poor    Patient requires continued skilled intervention: [x] Yes  [] No        PLAN: See eval  [] Continue per plan of care [] Alter current plan (see comments)  [x] Plan of care initiated [] Hold pending MD visit [] Discharge    Electronically signed by: Sonia Espinosa, PT, DPT    Note: If patient does not return for scheduled/recommended follow up visits, this note will serve as a discharge from care along with the most recent update on progress.

## 2021-06-24 ENCOUNTER — HOSPITAL ENCOUNTER (OUTPATIENT)
Dept: PHYSICAL THERAPY | Age: 21
Setting detail: THERAPIES SERIES
Discharge: HOME | End: 2021-06-24
Payer: COMMERCIAL

## 2021-06-24 PROCEDURE — 97112 NEUROMUSCULAR REEDUCATION: CPT

## 2021-06-24 PROCEDURE — 97110 THERAPEUTIC EXERCISES: CPT

## 2021-06-24 NOTE — FLOWSHEET NOTE
[facility], [place]    Physical Therapy Treatment Note/ Progress Report:     Date:  2021    Patient Name:  Alda Scherer    :  2000  MRN: [id number]  Medical/Treatment Diagnosis Information:  · Diagnosis: Q44.966 Stress fracture of L tibia; M25.562 L knee pain; M25.561 R knee pain  · Treatment Diagnosis: M84.362 Stress fracture of L tibia; M25.562 L knee pain; M25.561 R knee pain  Insurance/Certification information:  PT [facility] Information[facility] - [facility]  Physician Information:  Referring Practitioner: Celestino Monet  Plan of care signed (Y/N):     Date of Patient follow up with Physician:      Progress Report: []  Yes  [x]  No     Functional Scale: 28% disability - LEFS (58/80)   Date: 21    Date Range for reporting period:  Beginnin21  Endin days or 10 visits    Progress report due (10 Rx/or 30 days whichever is less):      Recertification due (POC duration/ or 90 days whichever is less): 21     Visit # Insurance Allowable Auth Needed   4 16 visits approved 21-21 [x]Yes    []No     Pain level:  R knee:  0-1/10 at start of session; L knee:  0-1/10 at start of session    SUBJECTIVE:  Patient arrived about 15 minutes late to session. She got stuck in traffic on her way to her appointment. Feeling better today. Thinks the increased soreness in her L knee happens because she unknowingly tries to offload her R knee to keep it from hurting and this causes her L knee to hurt. Thinks her L knee was more sore previous session because she went on a walk that required her to navigate several inclines/declines prior to session. OBJECTIVE: See eval   Observation:    Test measurements:      RESTRICTIONS/PRECAUTIONS: Anxiety/depression.     Exercises/Interventions:     Therapeutic Exercise  Min:  32 Resistance Sets/sec Reps Notes   Recumbent bike   5min Gentle rom    Hamstring stretch - long sit   30s 3 and progression per patient tolerance, in order to prevent re-injury. [x] Progressing: [] Met: [] Not Met: [] Adjusted  2. Patient will have a decrease in pain to facilitate improvement in movement, function, and ADLs as indicated by Functional Deficits. [x] Progressing: [] Met: [] Not Met: [] Adjusted    Long Term Goals: To be achieved in: 6 weeks  1. Disability index score of 10% or less for the LEFS to assist with reaching prior level of function. [x] Progressing: [] Met: [] Not Met: [] Adjusted  2. Patient will demonstrate an increase in strength to good proximal hip strength and control, within 5lb HHD in LE to allow for proper functional mobility as indicated by patients Functional Deficits. [x] Progressing: [] Met: [] Not Met: [] Adjusted  3. Patient will be able to perform all aspects of daily mobility including all ADLs and work tasks without increased symptoms or restriction. [x] Progressing: [] Met: [] Not Met: [] Adjusted  4. Patient will be able to go up/down stairs with reciprocal  Mechanics without increased symptoms or restriction. [x] Progressing: [] Met: [] Not Met: [] Adjusted  5. Patient will be able to initiate a running program for health and fitness without increased symptoms or restriction. [x] Progressing: [] Met: [] Not Met: [] Adjusted     Progression Towards Functional goals:  [] Patient is progressing as expected towards functional goals listed. [] Progression is slowed due to complexities listed. [] Progression has been slowed due to co-morbidities. [x] Plan just implemented, too soon to assess goals progression  [] Other:     ASSESSMENT:  Patient feeling better today. Feels like she sometimes over uses her L knee to avoid irritating her R knee. This ends up causing more pain on the L knee. Thinks that L knee pain previous session was due to navigating inclines/declines prior to session. Initiated BFR @60% LOP for quad strengthening today.  Patient tolerated pretty well, but did move more slowly due to faster rate of fatigue. Able to initiate more CKC weight bearing quad and hip strengthening today to include active leg press (instead of leg press isometrics) with eccentric SL bias (to initiate eccentric strengthening), wall sits, lateral TB walks, and TRX squat taps to chair. Patient required significant verbal cueing to improve use of hip hinge strategy with TRX squat taps as she tends to utilize more of an anterior knee strategy that results in mild R knee discomfort. Patient appropriately fatigued at end of session. Denies pain throughout. Return to Play: (if applicable)   []  Stage 1: Intro to Strength   []  Stage 2: Return to Run and Strength   []  Stage 3: Return to Jump and Strength   []  Stage 4: Dynamic Strength and Agility   []  Stage 5: Sport Specific Training     []  Ready to Return to Play, Meets All Above Stages   []  Not Ready for Return to Sports   Comments:            Treatment/Activity Tolerance:  [x] Patient tolerated treatment well [] Patient limited by fatique  [] Patient limited by pain  [] Patient limited by other medical complications  [] Other:     Overall Progression Towards Functional goals/ Treatment Progress Update:  [x] Patient is progressing as expected towards functional goals listed. [] Progression is slowed due to complexities/Impairments listed. [] Progression has been slowed due to co-morbidities.   [] Plan just implemented, too soon to assess goals progression <30days   [] Goals require adjustment due to lack of progress  [] Patient is not progressing as expected and requires additional follow up with physician  [] Other    Prognosis for POC: [x] Good [] Fair  [] Poor    Patient requires continued skilled intervention: [x] Yes  [] No        PLAN: See eval  [] Continue per plan of care [] Alter current plan (see comments)  [x] Plan of care initiated [] Hold pending MD visit [] Discharge    Electronically signed by: Leslie Harper PT, DPT    Note: If patient does not return for scheduled/recommended follow up visits, this note will serve as a discharge from care along with the most recent update on progress.

## 2021-07-01 ENCOUNTER — HOSPITAL ENCOUNTER (OUTPATIENT)
Dept: PHYSICAL THERAPY | Age: 21
Setting detail: THERAPIES SERIES
Discharge: HOME | End: 2021-07-01
Payer: COMMERCIAL

## 2021-07-01 PROCEDURE — 97110 THERAPEUTIC EXERCISES: CPT

## 2021-07-01 PROCEDURE — 97112 NEUROMUSCULAR REEDUCATION: CPT

## 2021-07-01 NOTE — FLOWSHEET NOTE
[facility], [place]    Physical Therapy Treatment Note/ Progress Report:     Date:  2021    Patient Name:  Sorin Markham    :  2000  MRN: [id number]  Medical/Treatment Diagnosis Information:  · Diagnosis: K17.837 Stress fracture of L tibia; M25.562 L knee pain; M25.561 R knee pain  · Treatment Diagnosis: M84.362 Stress fracture of L tibia; M25.562 L knee pain; M25.561 R knee pain  Insurance/Certification information:  PT Insurance Information: [facility] - 30 PT visits  Physician Information:  Referring Practitioner: Luan Moreno  Plan of care signed (Y/N):     Date of Patient follow up with Physician:      Progress Report: []  Yes  [x]  No     Functional Scale: 28% disability - LEFS ()   Date: 21    Date Range for reporting period:  Beginnin21  Endin days or 10 visits    Progress report due (10 Rx/or 30 days whichever is less):      Recertification due (POC duration/ or 90 days whichever is less): 21     Visit # Insurance Allowable Auth Needed   5 16 visits approved 21-21 [x]Yes    []No     Pain level:  R knee:  0/10 at start of session; L knee:  0/10 at start of session    SUBJECTIVE:  Reports she is now able to go up stairs one foot to a step with significantly less discomfort than previous. She just has to move a little slower and a little more carefully. Seems to be no difficulty with going down stairs. Regular walking throughout the day no longer painful. Pain present mostly after prolonged walking now, like after being up on her feet several hours for a [employer] this past weekend. OBJECTIVE: See eval   Observation:    Test measurements:      RESTRICTIONS/PRECAUTIONS: Anxiety/depression.     Exercises/Interventions:     Therapeutic Exercise  Min:  38 Resistance Sets/sec Reps Notes   Recumbent bike   5 min Warm up   Hamstring stretch - long sit   30s 3 Bilateral   Prone rectus femoris stretch w/1/2 foam bolster under distal thigh  30s 3 Bilateral; HEP   ITB stretch w/ rope   30s 3x HEP   Quad sets - long sit, supine, or seated on chair (for work positioning)  10s 10 Bilateral   BFR @60% LOP Blue cuff  8 min 30-15-15-15  SLR/sidelying hip abd/LAQ   SLR flexion  2 10 Bilateral; Cues to initiate with quad set first; Performed with BFR on the R   SLR abduction   2 10 Bilateral; Performed with BFR on R       Glut bridge Green loop around knees 5s 10    Clamshells Green loop around knees 1 15 Bilateral   Leg press - 2 up SL down 70# 2 10 Bilateral   Wall sits Green loop around knees 15s 5    Lateral TB walks Green loop around knees 2 36 feet                           Therapeutic Activities  Min:  0                                                               Manual Intervention  Min:  0       Knee mobs/PROM       Tib/Fem Mobs       Patella Mobs       Ankle mobs                     NMR Re-education  Min:  27       Cable column TKE lvl 4 5s 20 Bilateral    SLS balance on airex  20s 3 Bilateral   TRX staggered stance squat taps to chair  2 10    Lateral step downs on phone book  2 15 Bilateral   Glut step up on 8\" Red TB to cue improved TKE control on R only 2 10 Bilateral                                   Therapeutic Exercise and NMR EXR  [x] (98374) Provided verbal/tactile cueing for activities related to strengthening, flexibility, endurance, ROM for improvements in LE, proximal hip, and core control with self care, mobility, lifting, ambulation. [x] (57745) Provided verbal/tactile cueing for activities related to improving balance, coordination, kinesthetic sense, posture, motor skill, proprioception  to assist with LE, proximal hip, and core control in self care, mobility, lifting, ambulation and eccentric single leg control.      NMR and Therapeutic Activities:    [x] (38843 or 12586) Provided verbal/tactile cueing for activities related to improving balance, coordination, kinesthetic sense, posture, motor skill, proprioception and motor activation to allow for proper function of core, proximal hip and LE with self care and ADLs  [] (81446) Gait Re-education- Provided training and instruction to the patient for proper LE, core and proximal hip recruitment and positioning and eccentric body weight control with ambulation re-education including up and down stairs     Home Exercise Program:    [x] (71231) Reviewed/Progressed HEP activities related to strengthening, flexibility, endurance, ROM of core, proximal hip and LE for functional self-care, mobility, lifting and ambulation/stair navigation   [x] (12392)Reviewed/Progressed HEP activities related to improving balance, coordination, kinesthetic sense, posture, motor skill, proprioception of core, proximal hip and LE for self care, mobility, lifting, and ambulation/stair navigation      Manual Treatments:  PROM / STM / Oscillations-Mobs:  G-I, II, III, IV (PA's, Inf., Post.)  [x] (98218) Provided manual therapy to mobilize LE, proximal hip and/or LS spine soft tissue/joints for the purpose of modulating pain, promoting relaxation,  increasing ROM, reducing/eliminating soft tissue swelling/inflammation/restriction, improving soft tissue extensibility and allowing for proper ROM for normal function with self care, mobility, lifting and ambulation. Modalities:      Charges:  Timed Code Treatment Minutes: 55   Total Treatment Minutes: 55       [] EVAL (LOW) 97890 (typically 20 minutes face-to-face)  [] EVAL (MOD) 05091 (typically 30 minutes face-to-face)  [] EVAL (HIGH) 55464 (typically 45 minutes face-to-face)  [] RE-EVAL     [x] SV(56114) x 2    [] IONTO (16502)  [x] NMR (19443) x 2    [] VASO (98701)  [] Manual (61186) x     [] Other:  [] TA (20837)x     [] Mech Traction (33925)  [] ES(attended) (78862)     [] ES (un) (36132):     GOALS:  Patient stated goal: Increase strength. Be able to run for health and fitness.   [x] Progressing: [] Met: [] Not Met: [] Adjusted    Therapist goals for Patient:   Short Term Goals: To be achieved in: 2 weeks  1. Independent in HEP and progression per patient tolerance, in order to prevent re-injury. [x] Progressing: [] Met: [] Not Met: [] Adjusted  2. Patient will have a decrease in pain to facilitate improvement in movement, function, and ADLs as indicated by Functional Deficits. [x] Progressing: [] Met: [] Not Met: [] Adjusted    Long Term Goals: To be achieved in: 6 weeks  1. Disability index score of 10% or less for the LEFS to assist with reaching prior level of function. [x] Progressing: [] Met: [] Not Met: [] Adjusted  2. Patient will demonstrate an increase in strength to good proximal hip strength and control, within 5lb HHD in LE to allow for proper functional mobility as indicated by patients Functional Deficits. [x] Progressing: [] Met: [] Not Met: [] Adjusted  3. Patient will be able to perform all aspects of daily mobility including all ADLs and work tasks without increased symptoms or restriction. [x] Progressing: [] Met: [] Not Met: [] Adjusted  4. Patient will be able to go up/down stairs with reciprocal  Mechanics without increased symptoms or restriction. [x] Progressing: [] Met: [] Not Met: [] Adjusted  5. Patient will be able to initiate a running program for health and fitness without increased symptoms or restriction. [x] Progressing: [] Met: [] Not Met: [] Adjusted     Progression Towards Functional goals:  [x] Patient is progressing as expected towards functional goals listed. [] Progression is slowed due to complexities listed. [] Progression has been slowed due to co-morbidities. [] Plan just implemented, too soon to assess goals progression  [] Other:     ASSESSMENT:  Patient noting improved pain and ease of ascending stairs using reciprocal gait pattern. Primary complaints now with pain onset on R>L knee following prolonged walking.  Deferred stretching tasks for more focus on strength progression today because patient is compliant in regular performance as part of HEP. Noted improved tolerance to BFR compared to previous. Continued progression of SL eccentric CKC strengthening activities to include stepping and squatting tasks requiring significant motor control including lateral step downs, glut step ups on 8\" step, and TRX staggered stance squat taps to chair. Patient appropriately fatigued at end of session. Denies pain throughout. Return to Play: (if applicable)   []  Stage 1: Intro to Strength   []  Stage 2: Return to Run and Strength   []  Stage 3: Return to Jump and Strength   []  Stage 4: Dynamic Strength and Agility   []  Stage 5: Sport Specific Training     []  Ready to Return to Play, Meets All Above Stages   []  Not Ready for Return to Sports   Comments:            Treatment/Activity Tolerance:  [x] Patient tolerated treatment well [] Patient limited by fatique  [] Patient limited by pain  [] Patient limited by other medical complications  [] Other:     Overall Progression Towards Functional goals/ Treatment Progress Update:  [x] Patient is progressing as expected towards functional goals listed. [] Progression is slowed due to complexities/Impairments listed. [] Progression has been slowed due to co-morbidities. [] Plan just implemented, too soon to assess goals progression <30days   [] Goals require adjustment due to lack of progress  [] Patient is not progressing as expected and requires additional follow up with physician  [] Other    Prognosis for POC: [x] Good [] Fair  [] Poor    Patient requires continued skilled intervention: [x] Yes  [] No        PLAN: Initiate Alter G TM walk/jog progression when appropriate. Progress note nv.   [x] Continue per plan of care [] Alter current plan (see comments)  [] Plan of care initiated [] Hold pending MD visit [] Discharge    Electronically signed by: Radha Stringer, PT, DPT    Note: If patient does not return for scheduled/recommended follow up visits, this note will serve as a discharge from care along with the most recent update on progress.

## 2021-07-08 ENCOUNTER — HOSPITAL ENCOUNTER (OUTPATIENT)
Dept: PHYSICAL THERAPY | Age: 21
Setting detail: THERAPIES SERIES
Discharge: HOME | End: 2021-07-08
Payer: COMMERCIAL

## 2021-07-08 PROCEDURE — 97530 THERAPEUTIC ACTIVITIES: CPT

## 2021-07-08 PROCEDURE — 97110 THERAPEUTIC EXERCISES: CPT

## 2021-07-08 PROCEDURE — 97112 NEUROMUSCULAR REEDUCATION: CPT

## 2021-07-08 NOTE — PLAN OF CARE
[address], [facility]      Physical Therapy Re-Certification Plan of Care/MD UPDATE      Dear Dr. Vivi Franks,    We had the pleasure of treating the following patient for physical therapy services at [address] [place] [address]. A summary of our findings can be found in the updated assessment below. This includes our plan of care. If you have any questions or concerns regarding these findings, please do not hesitate to contact me at the office phone number checked above. Thank you for the referral.     Physician Signature:________________________________Date:__________________  By signing above (or electronic signature), therapists plan is approved by physician    Date Range Of Visits: 21 to 21  Total Visits to Date: 6  Overall Response to Treatment:   [x]Patient is responding well to treatment and improvement is noted with regards  to goals   []Patient should continue to improve in reasonable time if they continue HEP   []Patient has plateaued and is no longer responding to skilled PT intervention    []Patient is getting worse and would benefit from return to referring MD   []Patient unable to adhere to initial POC   [x]Other: Patient is progressing well. Currently pain free with most daily mobility including squatting and stair navigation. Does still have pain onset on R>L knee with prolonged ambulation and ambulation on uneven surfaces like inclines/declines. Strength is improving globally, but deficits still present. Balance is much better. Patient able to maintain SLS on even surfaces for 30 seconds bilaterally and on uneven surfaces for 20 seconds bilaterally prior to LOB. Patient wants to be able to jog/run for health and fitness purposes. Initiated [facility] program today.      Physical Therapy Treatment Note/ Progress Report:     Date:  2021    Patient Name:  Bree Richards    :  2000  MRN: [id number]  Medical/Treatment Diagnosis Information:  · Diagnosis: M84.362 Stress fracture of L tibia; M25.562 L knee pain; M25.561 R knee pain  · Treatment Diagnosis: M84.362 Stress fracture of L tibia; M25.562 L knee pain; M25.561 R knee pain  Insurance/Certification information:  PT Insurance Information: [facility] - 30 PT visits  Physician Information:  Referring Practitioner: Briana Dhillon  Plan of care signed (Y/N):     Date of Patient follow up with Physician:      Progress Report: [x]  Yes  []  No     Functional Scale: 11% disability - LEFS (71/80)   Date: 21    Date Range for reporting period:  Beginnin21  Endin days or 10 visits    Progress report due (10 Rx/or 30 days whichever is less): 9/3/46     Recertification due (POC duration/ or 90 days whichever is less): 21     Visit # Insurance Allowable Auth Needed   6 16 visits approved 21-21 [x]Yes    []No     Pain level:  R knee:  0/10 at start of session; L knee:  0/10 at start of session    SUBJECTIVE:  Reports she is now able to go up stairs one foot to a step with significantly less discomfort than previous. She just has to move a little slower and a little more carefully. Seems to be no pain with going down stairs. Regular walking throughout the day no longer painful. Pain present mostly after prolonged walking or walking on ueven surfaces like inclines/declines.     OBJECTIVE: See eval   Observation:    Test measurements:    (21):     ROM LEFT RIGHT   HIP Flex WNL WNL   HIP Abd       HIP Ext       HIP IR WNL WNL   HIP ER WNL WNL   Knee ext 0/+3 Hyper 0/+3 Hyper   Knee Flex 138/142 141/144   Ankle PF       Ankle DF       Ankle In       Ankle Ev       Strength  LEFT RIGHT   HIP Flexors 4+/5 4/5    HIP Abductors 4-/5  4-/5    HIP Ext 4/5 p! 4/5    Hip ER       Knee EXT (quad) 4+/5 4/5    Knee Flex (HS) 4+/5 4/5    Ankle DF       Ankle PF       Ankle Inv       Ankle EV               Circumference  Mid apex  7 cm 2 10 Bilateral   Glut step up on 8\" Red TB to cue improved TKE control on R only 2 10 Bilateral   Wobbleboard squat isos Green loop around knees 5s 10    SWB triple threat   nv                      Therapeutic Exercise and NMR EXR  [x] (91988) Provided verbal/tactile cueing for activities related to strengthening, flexibility, endurance, ROM for improvements in LE, proximal hip, and core control with self care, mobility, lifting, ambulation. [x] (62100) Provided verbal/tactile cueing for activities related to improving balance, coordination, kinesthetic sense, posture, motor skill, proprioception  to assist with LE, proximal hip, and core control in self care, mobility, lifting, ambulation and eccentric single leg control.      NMR and Therapeutic Activities:    [x] (30938 or 26750) Provided verbal/tactile cueing for activities related to improving balance, coordination, kinesthetic sense, posture, motor skill, proprioception and motor activation to allow for proper function of core, proximal hip and LE with self care and ADLs  [] (13411) Gait Re-education- Provided training and instruction to the patient for proper LE, core and proximal hip recruitment and positioning and eccentric body weight control with ambulation re-education including up and down stairs     Home Exercise Program:    [x] (57293) Reviewed/Progressed HEP activities related to strengthening, flexibility, endurance, ROM of core, proximal hip and LE for functional self-care, mobility, lifting and ambulation/stair navigation   [x] (27219)Reviewed/Progressed HEP activities related to improving balance, coordination, kinesthetic sense, posture, motor skill, proprioception of core, proximal hip and LE for self care, mobility, lifting, and ambulation/stair navigation      Manual Treatments:  PROM / STM / Oscillations-Mobs:  G-I, II, III, IV (PA's, Inf., Post.)  [x] (19928) Provided manual therapy to mobilize LE, proximal hip and/or LS spine soft tissue/joints for the purpose of modulating pain, promoting relaxation,  increasing ROM, reducing/eliminating soft tissue swelling/inflammation/restriction, improving soft tissue extensibility and allowing for proper ROM for normal function with self care, mobility, lifting and ambulation. Modalities:      Charges:  Timed Code Treatment Minutes: 50   Total Treatment Minutes: 50       [] EVAL (LOW) 85831 (typically 20 minutes face-to-face)  [] EVAL (MOD) 57440 (typically 30 minutes face-to-face)  [] EVAL (HIGH) 62722 (typically 45 minutes face-to-face)  [] RE-EVAL     [x] RR(69802) x 1    [] IONTO (45153)  [x] NMR (38413) x 1    [] VASO (46820)  [] Manual (24984) x     [] Other:  [x] TA (31745)x 1    [] Mech Traction (35739)  [] ES(attended) (07892)     [] ES (un) (26559):     GOALS:  Patient stated goal: Increase strength. Be able to run for health and fitness. [x] Progressing: [] Met: [] Not Met: [] Adjusted  Comment:  Initiated walk/jog program on Alter G today. Therapist goals for Patient:   Short Term Goals: To be achieved in: 2 weeks  1. Independent in HEP and progression per patient tolerance, in order to prevent re-injury. [] Progressing: [x] Met: [] Not Met: [] Adjusted  2. Patient will have a decrease in pain to facilitate improvement in movement, function, and ADLs as indicated by Functional Deficits. [] Progressing: [x] Met: [] Not Met: [] Adjusted    Long Term Goals: To be achieved in: 6 weeks  1. Disability index score of 10% or less for the LEFS to assist with reaching prior level of function. [x] Progressing: [] Met: [] Not Met: [] Adjusted  Comment:  11% disability on LEFS as of 7/8/21  2. Patient will demonstrate an increase in strength to good proximal hip strength and control, within 5lb HHD in LE to allow for proper functional mobility as indicated by patients Functional Deficits. [x] Progressing: [] Met: [] Not Met: [] Adjusted  Comment:  See objective measures above.  Strength and requires additional follow up with physician  [] Other    Prognosis for POC: [x] Good [] Fair  [] Poor    Patient requires continued skilled intervention: [x] Yes  [] No        PLAN: Progress Alter G TM walk/jog progression as oseas. [x] Continue per plan of care [] Alter current plan (see comments)  [] Plan of care initiated [] Hold pending MD visit [] Discharge    Electronically signed by: Maria Isabel Lang, PT, DPT, MS, SCS    Note: If patient does not return for scheduled/recommended follow up visits, this note will serve as a discharge from care along with the most recent update on progress.

## 2021-07-15 ENCOUNTER — HOSPITAL ENCOUNTER (OUTPATIENT)
Dept: PHYSICAL THERAPY | Age: 21
Setting detail: THERAPIES SERIES
Discharge: HOME | End: 2021-07-15
Payer: COMMERCIAL

## 2021-07-15 NOTE — FLOWSHEET NOTE
[address] [facility], [place]    Physical Therapy  Cancellation/No-show Note  Patient Name:  Watson Ramachandran  :  2000   Date:  7/15/2021  Cancelled visits to date: 0  No-shows to date: 0    For today's appointment patient:  [x]  Cancelled  []  Rescheduled appointment  []  No-show     Reason given by patient:  [x]  Patient ill  []  Conflicting appointment  []  No transportation    []  Conflict with work  []  No reason given  []  Other:     Comments:      Phone call information:   []  Phone call made today to patient at _ time at number provided:      []  Patient answered, conversation as follows:    []  Patient did not answer, message left as follows:  []  Phone call not made today  [x]  Phone call not needed - pt contacted us to cancel and provided reason for cancellation.      Electronically signed by:  Che Mckeon, PT, DPT, MS, SCS

## 2021-07-22 ENCOUNTER — HOSPITAL ENCOUNTER (OUTPATIENT)
Dept: PHYSICAL THERAPY | Age: 21
Setting detail: THERAPIES SERIES
Discharge: HOME | End: 2021-07-22
Payer: COMMERCIAL

## 2021-07-22 PROCEDURE — 97530 THERAPEUTIC ACTIVITIES: CPT

## 2021-07-22 PROCEDURE — 97110 THERAPEUTIC EXERCISES: CPT

## 2021-07-22 PROCEDURE — 97112 NEUROMUSCULAR REEDUCATION: CPT

## 2021-07-22 NOTE — FLOWSHEET NOTE
[facility] [address] [facility], [place]    Physical Therapy Treatment Note/ Progress Report:     Date:  2021    Patient Name:  Helga Knox    :  2000  MRN: [id number]  Medical/Treatment Diagnosis Information:  · Diagnosis: Y92.676 Stress fracture of L tibia; M25.562 L knee pain; M25.561 R knee pain  · Treatment Diagnosis: M84.362 Stress fracture of L tibia; M25.562 L knee pain; M25.561 R knee pain  Insurance/Certification information:  PT Insurance Information: [facility] - 30 PT visits  Physician Information:  Referring Practitioner: Amy Hankins  Plan of care signed (Y/N):     Date of Patient follow up with Physician:      Progress Report: []  Yes  [x]  No     Functional Scale: 11% disability - LEFS (71/80)   Date: 21    Date Range for reporting period:  Beginnin21  Endin days or 10 visits    Progress report due (10 Rx/or 30 days whichever is less): 55     Recertification due (POC duration/ or 90 days whichever is less): 21     Visit # Insurance Allowable Auth Needed   7 16 visits approved 21-21 [x]Yes    []No     Pain level:  R knee:  0/10 at start of session; L knee:  0/10 at start of session    SUBJECTIVE:  Had the stomach flu for about a week, so she was unable to come to PT session last week. Finally started to feel more back to normal about 2-3 days ago. Patient going up and down stairs at a more normal pace with one foot to a step now. Does not feel like she has to slow down to go up/down stairs. Able to walk for a prolonged amount of time including on inclines/declines without knee pain. Primary goal now is to get back to feeling comfortable doing a walk/jog program outside of PT on her own.     OBJECTIVE:    Observation:    Test measurements:    (21):     ROM LEFT RIGHT   HIP Flex WNL WNL   HIP Abd       HIP Ext       HIP IR WNL WNL   HIP ER WNL WNL   Knee ext 0/+3 Hyper 0/+3 Hyper   Knee Flex 138/142 141/144 Ankle PF       Ankle DF       Ankle In       Ankle Ev       Strength  LEFT RIGHT   HIP Flexors 4+/5 4/5    HIP Abductors 4-/5  4-/5    HIP Ext 4/5 p! 4/5    Hip ER       Knee EXT (quad) 4+/5 4/5    Knee Flex (HS) 4+/5 4/5    Ankle DF       Ankle PF       Ankle Inv       Ankle EV               Circumference  Mid apex  7 cm prox       40.0 cm  44.5 cm    40.0 cm  44.5 cm      Balance:  SLS:  No Trendelenberg present when in SLS bilaterally; no knee pain bilaterally; able to maintain 20 seconds bilaterally on uneven surface; able to maintain 30 seconds bilaterally on even surface. Functional Mobility/Transfers:  Squats:  Initiates with hip hinge strategy; equal weight bearing between LEs; reaches approximately 80% squat depth. RESTRICTIONS/PRECAUTIONS: Anxiety/depression. Exercises/Interventions:     Therapeutic Exercise  Min:  15 Resistance Sets/sec Reps Notes   Recumbent bike   5 min Warm up   Hamstring stretch - long sit   30s 3 Bilateral   Prone rectus femoris stretch w/1/2 foam bolster under distal thigh  30s 3 Bilateral; HEP   ITB stretch w/ rope   30s 3x HEP   Quad sets - long sit, supine, or seated on chair (for work positioning)  10s 10 Bilateral   BFR @60% LOP Blue cuff  8 min 30-15-15-15  SLR/sidelying hip abd/LAQ   SLR flexion  2 10 Bilateral; Cues to initiate with quad set first; Performed with BFR on the R   SLR abduction   2 10 Bilateral; Performed with BFR on R       Glut bridge Green loop around knees 5s 10    Clamshells Green loop around knees 1 15 Bilateral   Leg press - 2 up SL down 70# 1 25 Bilateral   Wall sits Green loop around knees 15s 5    Lateral TB walks Green loop around knees 2 36 feet    Mod.  BOSU lunge isos  10s 10 Bilateral                                 Therapeutic Activities  Min:  17       Alter G TM walk/jog  XL (lime) shorts 70% BW x4  80% BW x3 7 reps 60 sec walk/60 sec jog 2.2 mph walk/3.3 mph jog                                                    Manual Intervention  Min:  0       Knee mobs/PROM       Tib/Fem Mobs       Patella Mobs       Ankle mobs                     NMR Re-education  Min:  20       Cable column TKE lvl 4 5s 20 Bilateral    SLS balance on airex  30s 3 Bilateral   TRX staggered stance squat taps to chair  2 10    Lateral step downs on 4\"  2 15 Bilateral   Glut step up on 8\" Red TB to cue improved TKE control on R only 2 10 Bilateral   Wobbleboard squat isos Green loop around knees 5s 10    SWB triple threat Green SWB 1 10                      Therapeutic Exercise and NMR EXR  [x] (37258) Provided verbal/tactile cueing for activities related to strengthening, flexibility, endurance, ROM for improvements in LE, proximal hip, and core control with self care, mobility, lifting, ambulation. [x] (65896) Provided verbal/tactile cueing for activities related to improving balance, coordination, kinesthetic sense, posture, motor skill, proprioception  to assist with LE, proximal hip, and core control in self care, mobility, lifting, ambulation and eccentric single leg control.      NMR and Therapeutic Activities:    [x] (21729 or 73972) Provided verbal/tactile cueing for activities related to improving balance, coordination, kinesthetic sense, posture, motor skill, proprioception and motor activation to allow for proper function of core, proximal hip and LE with self care and ADLs  [] (66195) Gait Re-education- Provided training and instruction to the patient for proper LE, core and proximal hip recruitment and positioning and eccentric body weight control with ambulation re-education including up and down stairs     Home Exercise Program:    [x] (25527) Reviewed/Progressed HEP activities related to strengthening, flexibility, endurance, ROM of core, proximal hip and LE for functional self-care, mobility, lifting and ambulation/stair navigation   [x] (36316)Reviewed/Progressed HEP activities related to improving balance, coordination, kinesthetic sense, posture, motor skill, proprioception of core, proximal hip and LE for self care, mobility, lifting, and ambulation/stair navigation      Manual Treatments:  PROM / STM / Oscillations-Mobs:  G-I, II, III, IV (PA's, Inf., Post.)  [x] (29117) Provided manual therapy to mobilize LE, proximal hip and/or LS spine soft tissue/joints for the purpose of modulating pain, promoting relaxation,  increasing ROM, reducing/eliminating soft tissue swelling/inflammation/restriction, improving soft tissue extensibility and allowing for proper ROM for normal function with self care, mobility, lifting and ambulation. Modalities:      Charges:  Timed Code Treatment Minutes: 52   Total Treatment Minutes: 52       [] EVAL (LOW) 01400 (typically 20 minutes face-to-face)  [] EVAL (MOD) 27242 (typically 30 minutes face-to-face)  [] EVAL (HIGH) 50950 (typically 45 minutes face-to-face)  [] RE-EVAL     [x] OW(73390) x 1    [] IONTO (13750)  [x] NMR (08468) x 1    [] VASO (13380)  [] Manual (62686) x     [] Other:  [x] TA (55673)x 1    [] Mech Traction (96027)  [] ES(attended) (73233)     [] ES (un) (19609):     GOALS:  Patient stated goal: Increase strength. Be able to run for health and fitness. [x] Progressing: [] Met: [] Not Met: [] Adjusted  Comment:  Initiated walk/jog program on Alter G today. Therapist goals for Patient:   Short Term Goals: To be achieved in: 2 weeks  1. Independent in HEP and progression per patient tolerance, in order to prevent re-injury. [] Progressing: [x] Met: [] Not Met: [] Adjusted  2. Patient will have a decrease in pain to facilitate improvement in movement, function, and ADLs as indicated by Functional Deficits. [] Progressing: [x] Met: [] Not Met: [] Adjusted    Long Term Goals: To be achieved in: 6 weeks  1. Disability index score of 10% or less for the LEFS to assist with reaching prior level of function.    [x] Progressing: [] Met: [] Not Met: [] Adjusted  Comment:  11% disability on LEFS as of 7/8/21  2. Patient will demonstrate an increase in strength to good proximal hip strength and control, within 5lb HHD in LE to allow for proper functional mobility as indicated by patients Functional Deficits. [x] Progressing: [] Met: [] Not Met: [] Adjusted  Comment:  See objective measures above. Strength deficits still present on R>L. 3. Patient will be able to perform all aspects of daily mobility including all ADLs and work tasks without increased symptoms or restriction. [x] Progressing: [] Met: [] Not Met: [] Adjusted  Comment:  Not yet squatting to full depth. Prolonged ambulation still brings on increased knee pain on R>L as does ambulation on uneven surfaces such as inclines/declines. 4. Patient will be able to go up/down stairs with reciprocal mechanics without increased symptoms or restriction. [] Progressing: [x] Met: [] Not Met: [] Adjusted Comment:  Using reciprocal mechanics up/down. Does decrease speed and moves more carefully when descending stairs, but no pain. 5. Patient will be able to initiate a running program for health and fitness without increased symptoms or restriction. [x] Progressing: [] Met: [] Not Met: [] Adjusted  Comment:  Initiated walk/jog program on Alter G today. Progression Towards Functional goals:  [x] Patient is progressing as expected towards functional goals listed. [] Progression is slowed due to complexities listed. [] Progression has been slowed due to co-morbidities. [] Plan just implemented, too soon to assess goals progression  [] Other:     ASSESSMENT:  Patient going up and down stairs at a more normal pace with one foot to a step now. Does not feel like she has to slow down to go up/down stairs. Able to walk for a prolonged amount of time including on inclines/declines without knee pain. Primary goal now is to get back to feeling comfortable doing a walk/jog program outside of PT on her own.  Patient had the stomach flu the past week causing her to miss her PT session last week. Finally started to feel more back to her normal self 2-3 days ago. Despite this, able to progress Alter G TM walk/jog program to 80% BW with no complaints of bilateral knee pain. Was also able to initiate lunging tasks with performance of modified BOSU lunge isometric holds. Monitored patient very closely throughout session today due to patient being sick last week. Will continue to progress SL CK eccentric functional strengthening tasks and walk/jog program as tolerated to meet patient goals of getting back to working out and running for improved health and fitness. Return to Play: (if applicable)   []  Stage 1: Intro to Strength   []  Stage 2: Return to Run and Strength   []  Stage 3: Return to Jump and Strength   []  Stage 4: Dynamic Strength and Agility   []  Stage 5: Sport Specific Training     []  Ready to Return to Play, Meets All Above Stages   []  Not Ready for Return to Sports   Comments:            Treatment/Activity Tolerance:  [x] Patient tolerated treatment well [] Patient limited by fatique  [] Patient limited by pain  [] Patient limited by other medical complications  [] Other:     Overall Progression Towards Functional goals/ Treatment Progress Update:  [x] Patient is progressing as expected towards functional goals listed. [] Progression is slowed due to complexities/Impairments listed. [] Progression has been slowed due to co-morbidities. [] Plan just implemented, too soon to assess goals progression <30days   [] Goals require adjustment due to lack of progress  [] Patient is not progressing as expected and requires additional follow up with physician  [] Other    Prognosis for POC: [x] Good [] Fair  [] Poor    Patient requires continued skilled intervention: [x] Yes  [] No        PLAN: Progress strength tasks as tolerated. Progress Alter G TM walk/jog progression as oseas.   [x] Continue per plan of care [] Alter current plan (see comments)  [] Plan of care initiated [] Hold pending MD visit [] Discharge    Electronically signed by: Bethany Johnson, PT, DPT, MS, SCS    Note: If patient does not return for scheduled/recommended follow up visits, this note will serve as a discharge from care along with the most recent update on progress.

## 2024-01-15 ENCOUNTER — OFFICE VISIT (OUTPATIENT)
Dept: OBGYN CLINIC | Age: 24
End: 2024-01-15
Payer: COMMERCIAL

## 2024-01-15 VITALS
HEART RATE: 113 BPM | BODY MASS INDEX: 36.21 KG/M2 | DIASTOLIC BLOOD PRESSURE: 82 MMHG | WEIGHT: 204.4 LBS | SYSTOLIC BLOOD PRESSURE: 124 MMHG | HEIGHT: 63 IN | TEMPERATURE: 98.3 F

## 2024-01-15 DIAGNOSIS — Z12.39 SCREENING BREAST EXAMINATION: ICD-10-CM

## 2024-01-15 DIAGNOSIS — Z30.431 ENCOUNTER FOR ROUTINE CHECKING OF INTRAUTERINE CONTRACEPTIVE DEVICE (IUD): ICD-10-CM

## 2024-01-15 DIAGNOSIS — Z86.59 HX OF EATING DISORDER: ICD-10-CM

## 2024-01-15 DIAGNOSIS — Z01.419 WOMEN'S ANNUAL ROUTINE GYNECOLOGICAL EXAMINATION: Primary | ICD-10-CM

## 2024-01-15 DIAGNOSIS — Z12.4 PAP SMEAR FOR CERVICAL CANCER SCREENING: ICD-10-CM

## 2024-01-15 DIAGNOSIS — Z76.89 ENCOUNTER TO ESTABLISH CARE WITH NEW DOCTOR: ICD-10-CM

## 2024-01-15 PROCEDURE — G8484 FLU IMMUNIZE NO ADMIN: HCPCS | Performed by: OBSTETRICS & GYNECOLOGY

## 2024-01-15 PROCEDURE — 99385 PREV VISIT NEW AGE 18-39: CPT | Performed by: OBSTETRICS & GYNECOLOGY

## 2024-01-15 RX ORDER — BUPROPION HYDROCHLORIDE 150 MG/1
150 TABLET, EXTENDED RELEASE ORAL DAILY
COMMUNITY

## 2024-01-15 NOTE — PROGRESS NOTES
[facility]   Annual Exam        CC:   Chief Complaint   Patient presents with    New Patient     Establish Care       HPI:  23 y.o.  presents for her gynecologic annual exam.  New to office, [facility] care.   GEOFFREY (2019) -- doing well with device.   Light spotting every month, intense cramps, ibuprofen helps.   SA -- BF / no condoms. Both likes both.   Hx of anorexia -- loss periods in gigi yr if highschool, doing better now!    Health Maintenance:  Safe Enviroment: Y  Sexually Active: Y   N sexual problems  Contraception: N    Screening:  Last pap smear: ? 2019   History of abnormal pap smears: N  STI History: Denies      Review of Systems:   Constitutional: Negative for appetite change, chills and fever.   HENT: Negative for congestion, sneezing and sore throat.    Eyes: Negative for visual disturbance.   Respiratory: Negative for chest tightness, shortness of breath and wheezing.    Cardiovascular: Negative for chest pain, palpitations and leg swelling.   Gastrointestinal: Negative for abdominal pain, diarrhea, nausea and vomiting.   Endocrine: Negative for heat intolerance.   Genitourinary: Negative for difficulty urinating, dyspareunia, dysuria, menstrual problem and pelvic pain.   Musculoskeletal: Negative for back pain, neck pain and neck stiffness.   Skin: Negative for color change, pallor and rash.   Allergic/Immunologic: Negative for environmental allergies, food allergies and immunocompromised state.   Neurological: Negative for light-headedness, numbness and headaches.   Hematological: Does not bruise/bleed easily.   Psychiatric/Behavioral: Negative for behavioral problems, sleep disturbance and suicidal ideas.     Primary Care Physician: Madhav Montana MD    Obstetric History  OB History   No obstetric history on file.       Gynecologic History  Menstrual History:  Menarche: [age]  LMP: Patient's last menstrual period was 2023 (approximate).    Menstrual Period:

## 2024-01-18 LAB
HPV HR 12 DNA SPEC QL NAA+PROBE: NOT DETECTED
HPV16 DNA SPEC QL NAA+PROBE: NOT DETECTED
HPV16+18+H RISK 12 DNA SPEC-IMP: NORMAL
HPV18 DNA SPEC QL NAA+PROBE: NOT DETECTED